# Patient Record
Sex: FEMALE | Race: WHITE | NOT HISPANIC OR LATINO | Employment: FULL TIME | URBAN - METROPOLITAN AREA
[De-identification: names, ages, dates, MRNs, and addresses within clinical notes are randomized per-mention and may not be internally consistent; named-entity substitution may affect disease eponyms.]

---

## 2018-05-10 ENCOUNTER — OFFICE VISIT (OUTPATIENT)
Dept: BARIATRICS | Facility: CLINIC | Age: 53
End: 2018-05-10
Payer: COMMERCIAL

## 2018-05-10 VITALS
WEIGHT: 241.56 LBS | RESPIRATION RATE: 16 BRPM | SYSTOLIC BLOOD PRESSURE: 118 MMHG | TEMPERATURE: 98.8 F | HEART RATE: 87 BPM | BODY MASS INDEX: 36.61 KG/M2 | HEIGHT: 68 IN | DIASTOLIC BLOOD PRESSURE: 72 MMHG

## 2018-05-10 DIAGNOSIS — R73.03 PREDIABETES: ICD-10-CM

## 2018-05-10 DIAGNOSIS — R63.5 ABNORMAL WEIGHT GAIN: ICD-10-CM

## 2018-05-10 DIAGNOSIS — E55.9 VITAMIN D DEFICIENCY: ICD-10-CM

## 2018-05-10 DIAGNOSIS — E66.01 SEVERE OBESITY (BMI 35.0-39.9): Primary | ICD-10-CM

## 2018-05-10 PROBLEM — F41.8 DEPRESSION WITH ANXIETY: Status: ACTIVE | Noted: 2018-05-10

## 2018-05-10 PROCEDURE — 99204 OFFICE O/P NEW MOD 45 MIN: CPT | Performed by: PHYSICIAN ASSISTANT

## 2018-05-10 RX ORDER — MIRTAZAPINE 7.5 MG/1
7.5 TABLET, FILM COATED ORAL
COMMUNITY
Start: 2017-06-06

## 2018-05-10 RX ORDER — IBUPROFEN 200 MG
TABLET ORAL EVERY 6 HOURS PRN
COMMUNITY

## 2018-05-10 RX ORDER — FLUOXETINE HYDROCHLORIDE 40 MG/1
40 CAPSULE ORAL DAILY
Refills: 0 | COMMUNITY
Start: 2018-04-25

## 2018-05-10 NOTE — ASSESSMENT & PLAN NOTE
-Discussed options of HealthyCORE-Intensive Lifestyle Intervention Program, Very Low Calorie Diet-VLCD, Conservative Program and Intragastric Balloon and the role of weight loss medications   -Initial weight loss goal of 5-10% weight loss for improved health  -Screening labs: ordered; recommend she check coverage, particularly the vitamin D  -Patient is interested in pursuing VLCD  -would likely avoid sympathomimetics due to depression/anxiety    +STOP BANG: risks of undiagnosed or untreated sleep apnea reviewed with the patient  Offered sleep medicine referral, but she would like to see if risks improve/resolve with weight loss  Recheck STOP BANG in three months

## 2018-05-10 NOTE — ASSESSMENT & PLAN NOTE
-had major exacerbation 4-5 years ago which resulted in a significant weight loss and would therefore likely avoid sympathomimetics due to risk of exacerbation   -continue management with prescribing provider  -taking fluoxetine and Remeron p r n  (weight doug)

## 2018-05-10 NOTE — PATIENT INSTRUCTIONS
Goals: Food log (ie ) www myfitnesspal com,sparkpeople  com,loseit com,calorieking  com,etc    No sugary beverages  At least 80 oz of water daily

## 2018-05-10 NOTE — PROGRESS NOTES
Assessment/Plan:    Severe obesity (BMI 35 0-39 9) ([facility] )  -Discussed options of HealthyCORE-Intensive Lifestyle Intervention Program, Very Low Calorie Diet-VLCD, Conservative Program and Intragastric Balloon and the role of weight loss medications   -Initial weight loss goal of 5-10% weight loss for improved health  -Screening labs: ordered; recommend she check coverage, particularly the vitamin D  -Patient is interested in pursuing VLCD  -would likely avoid sympathomimetics due to depression/anxiety    +STOP BANG: risks of undiagnosed or untreated sleep apnea reviewed with the patient  Offered sleep medicine referral, but she would like to see if risks improve/resolve with weight loss  Recheck STOP BANG in three months  Prediabetes  -6 2 % in 04/2017  -Lifestyle changes for now  -recheck    Vitamin D deficiency  -32 in April of 2017  -recheck    Depression with anxiety  -had major exacerbation 4-5 years ago which resulted in a significant weight loss and would therefore likely avoid sympathomimetics due to risk of exacerbation   -continue management with prescribing provider  -taking fluoxetine and Remeron p r n  (weight doug)    Follow up for VLCD  Goals:  Food log (ie ) www myfitnesspal com,sparkpeople  com,loseit com,calorieking  com,etc    No sugary beverages  At least 80 oz of water daily  Diagnoses and all orders for this visit:    Severe obesity (BMI 35 0-39 9) ([facility] )  -     Comprehensive metabolic panel; Future  -     TSH, 3rd generation with T4 reflex; Future  -     Lipid panel; Future  -     HEMOGLOBIN A1C W/ EAG ESTIMATION; Future  -     Vitamin D 25 hydroxy; Future    Prediabetes  -     Comprehensive metabolic panel; Future  -     TSH, 3rd generation with T4 reflex; Future  -     Lipid panel; Future  -     HEMOGLOBIN A1C W/ EAG ESTIMATION; Future  -     Vitamin D 25 hydroxy; Future    Abnormal weight gain  -     Comprehensive metabolic panel;  Future  -     TSH, 3rd generation with T4 reflex; Future  -     Lipid panel; Future  -     HEMOGLOBIN A1C W/ EAG ESTIMATION; Future  -     Vitamin D 25 hydroxy; Future    Vitamin D deficiency  -     Vitamin D 25 hydroxy; Future    Other orders  -     FLUoxetine (PROzac) 40 MG capsule; Take 40 mg by mouth daily  -     mirtazapine (REMERON) 7 5 MG tablet; Take 7 5 mg by mouth  -     ibuprofen (MOTRIN) 200 mg tablet; Take by mouth every 6 (six) hours as needed for mild pain    Subjective:   Chief Complaint   Patient presents with    Consult     Pt is here for MWM consult  Patient ID: Blanca Ours  is a 47 y o  female with excess weight/obesity here to pursue weight managment  Past Medical History:   Diagnosis Date    Anxiety     Constipation     Depression     Diarrhea     IBS (irritable bowel syndrome)      HPI:  Obesity/Excess Weight:  Severity: Severe  Onset:  Since 1st pregnancy  Modifiers: Chiropractor led diet  Contributing factors: Insufficient Physical Activity, Pregnancy, Medications, Depression and difficulty sleeping  Associated symptoms: increased joint pain and snoring    Goals: 165 lbs  Typically has 2 alcoholic drinks 6-3X/RKGE    The following portions of the patient's history were reviewed and updated as appropriate: allergies, current medications, past family history, past medical history, past social history, past surgical history and problem list     Review of Systems   Constitutional: Negative for chills and fever  HENT: Positive for sore throat  Respiratory: Positive for cough (URI sx improving)  Negative for shortness of breath  Cardiovascular: Negative for chest pain and palpitations  Gastrointestinal: Positive for constipation (IBS) and diarrhea  Negative for abdominal pain, nausea and vomiting  Denies GERD   Genitourinary: Negative for dysuria  Musculoskeletal: Positive for arthralgias  Skin: Negative for rash  Neurological: Negative for headaches  Psychiatric/Behavioral: Negative for suicidal ideas  Objective:    /72 (BP Location: Left arm, Patient Position: Sitting, Cuff Size: Large)   Pulse 87   Temp 98 8 °F (37 1 °C) (Tympanic)   Resp 16   Ht 5' 7 5" (1 715 m)   Wt 110 kg (241 lb 9 oz)   BMI 37 28 kg/m²     Physical Exam   Nursing note and vitals reviewed  Constitutional   General appearance: Abnormal   well developed and obese  Eyes No conjunctival pallor  Ears, Nose, Mouth, and Throat Oral mucosa moist    Pulmonary   Respiratory effort: No increased work of breathing or signs of respiratory distress  Auscultation of lungs: Clear to auscultation, equal breath sounds bilaterally, no wheezes, no rales, no rhonci  Cardiovascular   Auscultation of heart: Normal rate and rhythm, normal S1 and S2, without murmurs  Examination of extremities for edema and/or varicosities: Normal   no edema  Abdomen   Abdomen: Abnormal   The abdomen was obese  Bowel sounds were normal  The abdomen was soft and nontender     Musculoskeletal   Gait and station: Normal     Psychiatric   Orientation to person, place and time: Normal     Affect: appropriate

## 2018-05-16 ENCOUNTER — OFFICE VISIT (OUTPATIENT)
Dept: BARIATRICS | Facility: CLINIC | Age: 53
End: 2018-05-16

## 2018-05-16 VITALS — WEIGHT: 240.2 LBS | HEIGHT: 68 IN | BODY MASS INDEX: 36.4 KG/M2

## 2018-05-16 DIAGNOSIS — R63.5 ABNORMAL WEIGHT GAIN: ICD-10-CM

## 2018-05-16 PROCEDURE — RECHECK

## 2018-05-16 PROCEDURE — VLCD

## 2018-05-16 NOTE — PROGRESS NOTES
Initial RD session for VLCD completed  Components of diet discussed including ketosis, hydration, possible side effects  2 weeks of product ordered and received  Will f/u via e-mail

## 2018-05-31 ENCOUNTER — PATIENT OUTREACH (OUTPATIENT)
Dept: BARIATRICS | Facility: CLINIC | Age: 53
End: 2018-05-31

## 2018-06-01 ENCOUNTER — PATIENT OUTREACH (OUTPATIENT)
Dept: BARIATRICS | Facility: CLINIC | Age: 53
End: 2018-06-01

## 2018-06-07 NOTE — PROGRESS NOTES
Weight Management Medical Nutrition Assessment  Candiss Draft is here for VLCD f/u  Current wt: 237 6 lbs  Loss of lbs 2 6 lbs in 2 weeks  Pt has been having some regular meals and snacks containing carbohydrates while on VLCD (meatloaf made w/breadcrumbs, almonds, cashews, coffee creamer, fruit, etc )  Pt is interested in transitioning off of VLCD and doing RD bundles instead  Modified ketogenic diet utilizing 2 meal replacements, 1 protein bar, 2 ketogenic snacks, and 1 meal consisting of 5 oz of lean protein & 1-1 5 cups of non-starchy vegetables was provided  Pt will follow up in ~2 weeks  Anthropometric Measurements  Start Weight (lbs): 240 2  Current Weight (lbs): 237 6 lbs  TBW % Change from start weight: 2%  Ideal Body Weight (lbs): 137 5 lbs    Weight Loss History  Previous weight loss attempts: Self Created Diets (Portion Control, Healthy Food Choices, etc )    Food and Nutrition Related History    Meal Plan  3 meal replacements & 1 protein bar  Regular snacks (fruit, nuts, etc )  Sometimes having a regular meal consisting of steak or meatloaf w/veggies    Beverages: 80 oz water & coffee w/creamer    Food restrictions: <50 g CHO  Cooking: self   Food Shopping: self    Physical Activity Intake  Activity: none  Physical limitations/barriers to exercise: no intense exercise while on VLCD    Estimated Needs  Energy  [employer] [place] Energy Needs: BMR : 0988   1-2# loss sedentary: 0652-4651             Lightly active:  4457-5438    Protein: 75-94 g (1 2-1 5g/kg IBW)  Fluid: 2188 mL (35mL/kg IBW)    Nutrition Diagnosis  Yes; Overweight/obesity  related to Excess energy intake as evidenced by  BMI more than normative standard for age and sex (obesity-grade II 35-39  9)       Nutrition Intervention  Meal Plan  Breakfast: meal replacement  Snack: keto snack  Lunch: meal replacement  Snack: keto snack  Dinner: 5 oz lean protein & 1-1 5 cups of non-starchy veggies  Snack: protein bar    Nutrition Education: Calorie controlled menu  Lean protein food choices  Healthy snack options  Food journaling tips      Nutrition Counseling:  Strategies: meal planning, portion sizes, healthy snack choices, hydration, fiber intake, protein intake, exercise, food journal      Monitoring and Evaluation:  Evaluation criteria:  Energy Intake: 5821-2474  Meet protein needs  Maintain adequate hydration  Monitor weekly weight  Meal planning/preparation  Food journal   Portions at mealtimes and snacks    Barriers to learning:none  Readiness to change: Action  Comprehension: good  Expected Compliance: good

## 2018-06-11 ENCOUNTER — OFFICE VISIT (OUTPATIENT)
Dept: BARIATRICS | Facility: CLINIC | Age: 53
End: 2018-06-11

## 2018-06-11 VITALS — WEIGHT: 237.6 LBS | BODY MASS INDEX: 36.01 KG/M2 | HEIGHT: 68 IN

## 2018-06-11 DIAGNOSIS — R63.5 ABNORMAL WEIGHT GAIN: ICD-10-CM

## 2018-06-11 PROCEDURE — RECHECK

## 2018-06-11 PROCEDURE — VLCD

## 2018-06-22 NOTE — PROGRESS NOTES
Weight Management Medical Nutrition Assessment  Palmer Alpers is here for an RD bundle visit (1/3)  Current weight: 233 1 lbs  Loss of 7 1 lbs in 1 month  Pt was following a modified ketogenic diet using 2 meal replacements & 1 regular meal after transitioning off of the VLCD ~ 2 weeks ago  Pt is interested in keeping her carbohydrate intake <50 as she incorporates more regular food  Reviewed kcal guidelines and food sources of carbohydrates  Pt asked questions regarding fat and sodium, which were answered  Written meal plan provided  Anthropometric Measurements  Start Weight (lbs): 240 2 lbs  Current Weight (lbs): 233 1 lbs  TBW % Change from start weight: 4%  Ideal Body Weight (lbs): 137 5 lbs    Weight Loss History  Attempted VLCD for <2 weeks - did not tolerate well    Food and Nutrition Related History    Meal Plan  Breakfast: shake    Snack: protein  Lunch: shake  Snack: microwave popcorn  Dinner: 1/2 a burger w/a hotdog OR chicken w/asian cabbage w/light dressing OR something from [employer] on work days (tries to modify to make it low carb  Snack: 1/2 an avocado or cheese or beef jerky     Beverages: 80 oz zero calorie beverages & water    Food restrictions: none  Cooking: self   Food Shopping: self    Physical Activity Intake  Activity: none    Estimated Needs  Energy  [employer] [place] Energy Needs: BMR : 6123   1-2# loss sedentary: 5460-8433             Lightly active:  0055-4672    Protein: 75-95 g (1 2-1 5g/kg IBW)  Fluid: 2188 mL (35mL/kg IBW)    Nutrition Diagnosis  Yes; Overweight/obesity  related to Excess energy intake as evidenced by  BMI more than normative standard for age and sex (obesity-grade II 35-39  9)       Nutrition Intervention  Meal Plan  7919-7729 kcal  Adequate protein intake: 75-95 g     Nutrition Education:   Healthy Core Manual  Calorie controlled menu  Lean protein food choices  Healthy snack options  Food journaling tips      Nutrition Counseling:  Strategies: meal planning, portion sizes, healthy snack choices, hydration, fiber intake, protein intake, exercise, food journal      Monitoring and Evaluation:  Evaluation criteria:  Energy Intake: 1587-7760 kcal  Meet protein needs  Maintain adequate hydration  Monitor weekly weight  Meal planning/preparation  Food journal   Portions at mealtimes and snacks  Physical activity     Barriers to learning:none  Readiness to change: Action  Comprehension: good  Expected Compliance: good

## 2018-06-25 ENCOUNTER — OFFICE VISIT (OUTPATIENT)
Dept: BARIATRICS | Facility: CLINIC | Age: 53
End: 2018-06-25

## 2018-06-25 VITALS — HEIGHT: 68 IN | BODY MASS INDEX: 35.33 KG/M2 | WEIGHT: 233.1 LBS

## 2018-06-25 DIAGNOSIS — R63.5 ABNORMAL WEIGHT GAIN: ICD-10-CM

## 2018-06-25 PROCEDURE — DB3PK

## 2018-06-25 PROCEDURE — RECHECK

## 2018-07-09 ENCOUNTER — OFFICE VISIT (OUTPATIENT)
Dept: BARIATRICS | Facility: CLINIC | Age: 53
End: 2018-07-09

## 2018-07-09 VITALS — WEIGHT: 229.3 LBS | HEIGHT: 68 IN | BODY MASS INDEX: 34.75 KG/M2

## 2018-07-09 DIAGNOSIS — R63.5 ABNORMAL WEIGHT GAIN: Primary | ICD-10-CM

## 2018-07-09 PROCEDURE — RECHECK

## 2018-07-09 NOTE — PROGRESS NOTES
Weight Management Medical Nutrition Assessment  Jane Morales is here for an RD bundle visit (2/3)  Current weight: 229 3 lbs  Loss of 3 8 lbs since last visit, and 10 9 lbs overall  Pt has been consuming 1-2 shakes most days, and is striving to limit her carbohydrate intake to <50 g/day  Pt is not tracking her intake, but estimates that she is having 1200 kcal/day  Pt receptive to tracking her intake via MyFitnessPal  Pt has been planning ahead and is keeping healthy snacks and shakes on hand to avoid temptations  Pt will follow up in ~2 weeks  Tanita body composition to be completed at next appointment  Anthropometric Measurements  Start Weight (lbs): 240 2 lbs  Current Weight (lbs): 229 3 lbs   TBW % Change from start weight:  Ideal Body Weight (lbs): 137 5 lbs     Weight Loss History  Attempted VLCD for <2 weeks - did not tolerate well    Food and Nutrition Related History    Meal Plan  Breakfast: shake or whole grain avocado toast w/smoked salmon   Snack: protein bar  Lunch: shake   Snack: almonds or olives or 1/2 a peach or cheese   Dinner: protein (pork, chicken, or shrimp) w/riced cauliflower and/or salad   Snack:1 tbsp of peanut butter or popcorn or dessert when on vacation in [facility] 140:   Striving for 64 oz water & coffee w/whole milk    Cooking: self   Food Shopping: self    Physical Activity Intake  Activity: light walking  Physical limitations/barriers to exercise: none    Estimated Needs  Energy  [employer] [place] Energy Needs: BMR : 7604   1-2# loss sedentary:  2271-8231             Lightly active:  4226-5255    Protein: 75-95 g (1 2-1 5g/kg IBW)  Fluid: 2188 mL (35mL/kg IBW)    Nutrition Diagnosis  Yes; Overweight/obesity  related to Excess energy intake as evidenced by  BMI more than normative standard for age and sex (obesity-grade II 35-39  9)       Nutrition Intervention  Meal Plan  4259-9693 kcal/day  Adequate protein intake: 75-95 g/day  Beging tracking intake via MyFitnessPal    Nutrition Education:   Calorie controlled menu  Lean protein food choices  Healthy snack options  Food journaling tips      Nutrition Counseling:  Strategies: meal planning, portion sizes, healthy snack choices, hydration, fiber intake, protein intake, exercise, food journal      Monitoring and Evaluation:  Evaluation criteria:  Energy Intake:  Meet protein needs  Maintain adequate hydration  Monitor weekly weight  Meal planning/preparation  Food journal   Portions at mealtimes and snacks  Physical activity     Barriers to learning:none  Readiness to change: Action  Comprehension: very good  Expected Compliance: very good

## 2018-07-19 ENCOUNTER — OFFICE VISIT (OUTPATIENT)
Dept: BARIATRICS | Facility: CLINIC | Age: 53
End: 2018-07-19

## 2018-07-19 VITALS — HEIGHT: 68 IN | WEIGHT: 227.8 LBS | BODY MASS INDEX: 34.53 KG/M2

## 2018-07-19 DIAGNOSIS — R63.5 ABNORMAL WEIGHT GAIN: Primary | ICD-10-CM

## 2018-07-19 PROCEDURE — RECHECK: Performed by: DIETITIAN, REGISTERED

## 2018-07-19 NOTE — PROGRESS NOTES
Weight Management Medical Nutrition Assessment  Celsa Evans presented for a 3/3 RD meal planning session  Today's weight is 227 8#   She has lost 1 5#  in the past 2 weeksmonth and overall she has lost 14 1# (6 2% TBW)  in the past 8 weeks  She stated she was recently on vacation and tried to be mindful of food choices and portions  She is not formally food journaling but stated she is trying to keep calories under 1500 calories  Sweet cravings less  She is traveling to [facility] for 2 weeks and we discussed following a low calorie diet during that timeframe  She stated once she returns she would like to return to a low calorie Keto diet  Anthropometric Measurements  Start Weight (lbs): 241 9#  Current Weight (lbs): 227 8#  TBW % Change from start weight:6 2%  Ideal Body Weight (lbs):1375  #  Goal Weight (lbs): STG: under 200#   LT#     Weight Loss History  Previous weight loss attempts: Self Created Diets (Portion Control, Healthy Food Choices, etc )  [facility] Diet    Food and Nutrition Related History  Food Recall  Breakfast:Shake    Snack:  Lunch:Cheese and salami and olives or Shake  Snack:bar  Dinner:lean protein/ veggies rice cauliflower  Snack:popcorn       Beverages: water  Volume of beverage intake: 60-80oz daily     Weekends: Same  Cravings: salty carbs  Trouble area of day:between meals    Frequency of Eating out: every 3 days  Food restrictions:none  Cooking: self   Food Shopping: self    Physical Activity Intake  Activity:none  Frequency:none  Physical limitations/barriers to exercise: none    Estimated Needs  Energy    [employer] [place] Energy Needs: BMR : 1684 calories    1-2# loss weekly sedentary:  2083-6136 calories              1-2# loss weekly lightly active:9444-9590 calories   Protein:75-95gm      (1 2-1 5g/kg IBW)  Fluid: 73oz      (35mL/kg IBW)    Nutrition Diagnosis  Yes;     Overweight/obesity  related to Excess energy intake as evidenced by  BMI more than normative standard for age and sex (obesity-grade II 35-39  9)       Nutrition Intervention    Nutrition Prescription  Calories:1200 calories on sedentary days and flex to 8726-9139 calories on active days  Protein:70-90gm per day   Fluid:75oz daily     Meal Plan  Breakfast:  Snack:  Lunch:  Snack  Dinner:  Snack:    Nutrition Education:      Calorie controlled menu  Lean protein food choices  Healthy snack options  Food journaling tips      Nutrition Counseling:  Strategies: meal planning, portion sizes, healthy snack choices, hydration, fiber intake, protein intake, exercise, food journal      Monitoring and Evaluation:  Evaluation criteria:  Energy Intake  Meet protein needs  Maintain adequate hydration  Monitor weekly weight  Meal planning/preparation  Food journal   Decreased portions at mealtimes and snacks  Physical activity     Barriers to learning:none  Readiness to change: Action  Comprehension: good  Expected Compliance: good

## 2018-08-08 ENCOUNTER — OFFICE VISIT (OUTPATIENT)
Dept: BARIATRICS | Facility: CLINIC | Age: 53
End: 2018-08-08

## 2018-08-08 VITALS — WEIGHT: 227.1 LBS | HEIGHT: 68 IN | BODY MASS INDEX: 34.42 KG/M2

## 2018-08-08 DIAGNOSIS — R63.5 ABNORMAL WEIGHT GAIN: ICD-10-CM

## 2018-08-08 PROCEDURE — RECHECK

## 2018-08-08 PROCEDURE — DB3PK

## 2018-08-08 NOTE — PROGRESS NOTES
Weight Management Medical Nutrition Assessment  Segundo Atkins has renewed for another bundle session (1 of 3) RD meal planning session  Today's weight is 227 1# (maintained weight while on 2 wk vacation)  Overall she has lost 14 7# (6 2% TBW)  in just under 3 months  Has not yet started tracking but recognizes the importance of this which was reinforced  Has been trying to stay with <50 gm carb but wondering if she needs to do this to lose weight  Discussed importance of doing something realistic that can last long term  Moderate amounts of carbs discussed along with healthy sources  She has resumed 1 replacement/day  No exercise at this time but will try to focus on this over the next 2 wks  She will f/u in ~2 wks  Anthropometric Measurements  Start Weight (lbs): 241 9#  Current Weight (lbs): 227 1 lbs  TBW % Change from start weight:6%  Ideal Body Weight (lbs):1375  #  Goal Weight (lbs): STG: under 200#   LT#     Weight Loss History  Previous weight loss attempts: Self Created Diets (Portion Control, Healthy Food Choices, etc )  [facility] Diet    Food and Nutrition Related History  Food Recall  Breakfast 9:30: Shake    Snack: skip  Lunch 12-1: 180 coco protein pack, olives, bar  Snack: 1/4 cup nuts  Dinner 6-8: lean protein/ veggies rice cauliflower  Snack: skip     Beverages: water  Volume of beverage intake: 60-80oz daily     Weekends: Same  Cravings: salty carbs  Trouble area of day:between meals    Frequency of Eating out: every 3 days  Food restrictions:none  Cooking: self   Food Shopping: self    Physical Activity Intake  Activity:none  Frequency:none  Physical limitations/barriers to exercise: none    Estimated Needs  Energy    [employer] [place] Energy Needs: BMR : 1684 calories    1-2# loss weekly sedentary:  6783-3167 calories              1-2# loss weekly lightly active:0308-9351 calories   Protein:75-95gm      (1 2-1 5g/kg IBW)  Fluid: 73oz      (35mL/kg IBW)    Nutrition Diagnosis  Yes;     Overweight/obesity related to Excess energy intake as evidenced by  BMI more than normative standard for age and sex (obesity-grade II 35-39  9)       Nutrition Intervention    Nutrition Prescription  Calories:1200 calories on sedentary days and flex to 1155-9276 calories on active days  Protein:70-90gm per day   Fluid:75oz daily     Meal Plan  Breakfast: shake  Snack: food snack  Lunch: ~400 calories, 20 gm  Snack: food snack  Dinner: 3 oz lean protein, veggies  Snack: food snack    Nutrition Education:      Calorie controlled menu  Lean protein food choices  Healthy snack options  Food journaling tips  Protein shakes  Exercise    Nutrition Counseling:  Strategies: meal planning, portion sizes, healthy snack choices, hydration, fiber intake, protein intake, exercise, food journal      Monitoring and Evaluation:  Evaluation criteria:  Energy Intake  Meet protein needs  Maintain adequate hydration  Monitor weekly weight  Meal planning/preparation  Food journal   Decreased portions at mealtimes and snacks  Physical activity     Barriers to learning:none  Readiness to change: Action  Comprehension: good  Expected Compliance: good

## 2018-09-27 ENCOUNTER — OFFICE VISIT (OUTPATIENT)
Dept: BARIATRICS | Facility: CLINIC | Age: 53
End: 2018-09-27

## 2018-09-27 VITALS — BODY MASS INDEX: 34.57 KG/M2 | HEIGHT: 68 IN | WEIGHT: 228.1 LBS

## 2018-09-27 DIAGNOSIS — R63.5 ABNORMAL WEIGHT GAIN: Primary | ICD-10-CM

## 2018-09-27 PROCEDURE — RECHECK: Performed by: DIETITIAN, REGISTERED

## 2018-09-27 NOTE — PROGRESS NOTES
Weight Management Medical Nutrition Assessment  Sharath Scott presents for 1/3 RD meal planning session  Today's weight is 228 1#   She has had a 1 # gain since our last visit and overall she has lost 13 9# (6% TBW)  in the past 4 months  She stated she has been very mindful of portions   Not food logging and predicts some days her calorie range is within weight loss range and others are higher  She stated she has all the tools and stated just needs to get back on track  She will resume VLCD in 2 weeks if labs WNL per [place]         Anthropometric Measurements  Start Weight (lbs): 241 9#  Current Weight (lbs):  228  1#lbs  TBW % Change from start weight:6%%  Ideal Body Weight (lbs):137  5  #  Goal Weight (lbs): STG: under 200#   LT#      Weight Loss History  Previous weight loss attempts: Self Created Diets (Portion Control, Healthy Food Choices, etc )  VLCD Diet     Food and Nutrition Related History  Food Recall  Breakfast 9:30: skip or Shake                            Snack: skip  Lunch 12-: salami and provolone cheese - 180 calories   Snack: fruit / apple chips - previous salty   Dinner -8: Gobble meals - sometimes larger portions  Snack:  not a trouble area      Beverages: water, crystal light   Volume of beverage intake: 60oz daily      Weekends: Same  Cravings: sweets/salty carbs  Trouble area of day:between meals     Frequency of Eating out: every 3 days  Food restrictions:none  Cooking: self   Food Shopping: self     Physical Activity Intake  Activity:none  Frequency:none  Physical limitations/barriers to exercise: none     Estimated Needs  Energy     [employer] [place] Energy Needs: BMR :  1684 calories calories    1-2# loss weekly sedentary: 7770-8089   calories              1-2# loss weekly lightly active:  7238-0675 calories   Protein:75-95gm      (1 2-1 5g/kg IBW)  Fluid: 73oz      (35mL/kg IBW)     Nutrition Diagnosis  Yes;     Overweight/obesity  related to Excess energy intake as evidenced by  BMI more than normative standard for age and sex (obesity-grade II 32-38  9)     Nutrition Intervention     Nutrition Prescription  Calories:1200 calories on sedentary days and flex to 0838-0517 calories on active days  Protein:70-90gm per day   Fluid:75oz daily      Meal Plan  Breakfast: shake  Snack: food snack  Lunch: ~400 calories, 20 gm  Snack: food snack  Dinner: 3 oz lean protein, veggies  Snack: food snack     Nutrition Education:       Calorie controlled menu  Lean protein food choices  Healthy snack options  Food journaling tips  Protein shakes  Exercise     Nutrition Counseling:  Strategies: meal planning, portion sizes, healthy snack choices, hydration, fiber intake, protein intake, exercise, food journal        Monitoring and Evaluation:  Evaluation criteria:  Energy Intake  Meet protein needs  Maintain adequate hydration  Monitor weekly weight  Meal planning/preparation  Food journal   Decreased portions at mealtimes and snacks  Physical activity      Barriers to learning:none  Readiness to change: Action  Comprehension: good  Expected Compliance: good

## 2018-10-10 ENCOUNTER — PATIENT OUTREACH (OUTPATIENT)
Dept: BARIATRICS | Facility: CLINIC | Age: 53
End: 2018-10-10

## 2024-06-19 ENCOUNTER — APPOINTMENT (OUTPATIENT)
Dept: LAB | Facility: CLINIC | Age: 59
End: 2024-06-19
Payer: COMMERCIAL

## 2024-06-19 DIAGNOSIS — R10.84 GENERALIZED ABDOMINAL PAIN: ICD-10-CM

## 2024-06-19 DIAGNOSIS — E66.9 OBESITY, UNSPECIFIED CLASSIFICATION, UNSPECIFIED OBESITY TYPE, UNSPECIFIED WHETHER SERIOUS COMORBIDITY PRESENT: ICD-10-CM

## 2024-06-19 LAB
ALBUMIN SERPL BCG-MCNC: 4.2 G/DL (ref 3.5–5)
ALP SERPL-CCNC: 58 U/L (ref 34–104)
ALT SERPL W P-5'-P-CCNC: 11 U/L (ref 7–52)
AMYLASE SERPL-CCNC: 36 IU/L (ref 29–103)
ANION GAP SERPL CALCULATED.3IONS-SCNC: 11 MMOL/L (ref 4–13)
AST SERPL W P-5'-P-CCNC: 14 U/L (ref 13–39)
BASOPHILS # BLD AUTO: 0.04 THOUSANDS/ÂΜL (ref 0–0.1)
BASOPHILS NFR BLD AUTO: 0 % (ref 0–1)
BILIRUB SERPL-MCNC: 0.81 MG/DL (ref 0.2–1)
BUN SERPL-MCNC: 13 MG/DL (ref 5–25)
CALCIUM SERPL-MCNC: 9.2 MG/DL (ref 8.4–10.2)
CHLORIDE SERPL-SCNC: 103 MMOL/L (ref 96–108)
CHOLEST SERPL-MCNC: 189 MG/DL
CO2 SERPL-SCNC: 24 MMOL/L (ref 21–32)
CREAT SERPL-MCNC: 0.95 MG/DL (ref 0.6–1.3)
EOSINOPHIL # BLD AUTO: 0.03 THOUSAND/ÂΜL (ref 0–0.61)
EOSINOPHIL NFR BLD AUTO: 0 % (ref 0–6)
ERYTHROCYTE [DISTWIDTH] IN BLOOD BY AUTOMATED COUNT: 12.4 % (ref 11.6–15.1)
EST. AVERAGE GLUCOSE BLD GHB EST-MCNC: 131 MG/DL
GFR SERPL CREATININE-BSD FRML MDRD: 66 ML/MIN/1.73SQ M
GLUCOSE P FAST SERPL-MCNC: 112 MG/DL (ref 65–99)
HBA1C MFR BLD: 6.2 %
HCT VFR BLD AUTO: 39.1 % (ref 34.8–46.1)
HDLC SERPL-MCNC: 68 MG/DL
HGB BLD-MCNC: 12.5 G/DL (ref 11.5–15.4)
IMM GRANULOCYTES # BLD AUTO: 0.04 THOUSAND/UL (ref 0–0.2)
IMM GRANULOCYTES NFR BLD AUTO: 0 % (ref 0–2)
LDLC SERPL CALC-MCNC: 108 MG/DL (ref 0–100)
LIPASE SERPL-CCNC: 22 U/L (ref 11–82)
LYMPHOCYTES # BLD AUTO: 2.85 THOUSANDS/ÂΜL (ref 0.6–4.47)
LYMPHOCYTES NFR BLD AUTO: 31 % (ref 14–44)
MCH RBC QN AUTO: 29 PG (ref 26.8–34.3)
MCHC RBC AUTO-ENTMCNC: 32 G/DL (ref 31.4–37.4)
MCV RBC AUTO: 91 FL (ref 82–98)
MONOCYTES # BLD AUTO: 0.87 THOUSAND/ÂΜL (ref 0.17–1.22)
MONOCYTES NFR BLD AUTO: 9 % (ref 4–12)
NEUTROPHILS # BLD AUTO: 5.45 THOUSANDS/ÂΜL (ref 1.85–7.62)
NEUTS SEG NFR BLD AUTO: 60 % (ref 43–75)
NONHDLC SERPL-MCNC: 121 MG/DL
NRBC BLD AUTO-RTO: 0 /100 WBCS
PLATELET # BLD AUTO: 250 THOUSANDS/UL (ref 149–390)
PMV BLD AUTO: 10.9 FL (ref 8.9–12.7)
POTASSIUM SERPL-SCNC: 4.1 MMOL/L (ref 3.5–5.3)
PROT SERPL-MCNC: 7.4 G/DL (ref 6.4–8.4)
RBC # BLD AUTO: 4.31 MILLION/UL (ref 3.81–5.12)
SODIUM SERPL-SCNC: 138 MMOL/L (ref 135–147)
TRIGL SERPL-MCNC: 63 MG/DL
TSH SERPL DL<=0.05 MIU/L-ACNC: 1.5 UIU/ML (ref 0.45–4.5)
WBC # BLD AUTO: 9.28 THOUSAND/UL (ref 4.31–10.16)

## 2024-06-19 PROCEDURE — 80061 LIPID PANEL: CPT

## 2024-06-19 PROCEDURE — 85025 COMPLETE CBC W/AUTO DIFF WBC: CPT

## 2024-06-19 PROCEDURE — 83690 ASSAY OF LIPASE: CPT

## 2024-06-19 PROCEDURE — 36415 COLL VENOUS BLD VENIPUNCTURE: CPT

## 2024-06-19 PROCEDURE — 82150 ASSAY OF AMYLASE: CPT

## 2024-06-19 PROCEDURE — 83036 HEMOGLOBIN GLYCOSYLATED A1C: CPT

## 2024-06-19 PROCEDURE — 80053 COMPREHEN METABOLIC PANEL: CPT

## 2024-06-19 PROCEDURE — 84443 ASSAY THYROID STIM HORMONE: CPT

## 2025-07-25 ENCOUNTER — TELEPHONE (OUTPATIENT)
Age: 60
End: 2025-07-25

## 2025-07-25 NOTE — TELEPHONE ENCOUNTER
LMOM for patient to call the office and reschedule appt on 7/31. Provider will not be in the office. MyChart message sent as well

## 2025-07-29 NOTE — TELEPHONE ENCOUNTER
Second attempt to contact patient. LMOM that we need to cancel and provided call back number to reschedule